# Patient Record
Sex: MALE | URBAN - METROPOLITAN AREA
[De-identification: names, ages, dates, MRNs, and addresses within clinical notes are randomized per-mention and may not be internally consistent; named-entity substitution may affect disease eponyms.]

---

## 2017-08-01 ENCOUNTER — TELEPHONE (OUTPATIENT)
Dept: ORTHOPEDIC SURGERY | Facility: CLINIC | Age: 22
End: 2017-08-01
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
## 2017-08-01 NOTE — TELEPHONE ENCOUNTER
2nd call from foreign Doll.  The patient has decided to switch providers at [facility] so no appt is necessary now.      Please disregard message at this time.